# Patient Record
Sex: FEMALE | Race: WHITE | NOT HISPANIC OR LATINO | Employment: FULL TIME | URBAN - NONMETROPOLITAN AREA
[De-identification: names, ages, dates, MRNs, and addresses within clinical notes are randomized per-mention and may not be internally consistent; named-entity substitution may affect disease eponyms.]

---

## 2024-07-22 ENCOUNTER — OFFICE VISIT (OUTPATIENT)
Dept: PSYCHIATRY | Facility: CLINIC | Age: 54
End: 2024-07-22
Payer: COMMERCIAL

## 2024-07-22 VITALS
SYSTOLIC BLOOD PRESSURE: 130 MMHG | BODY MASS INDEX: 34.31 KG/M2 | OXYGEN SATURATION: 97 % | HEIGHT: 68 IN | HEART RATE: 69 BPM | WEIGHT: 226.4 LBS | DIASTOLIC BLOOD PRESSURE: 90 MMHG

## 2024-07-22 DIAGNOSIS — F33.2 SEVERE EPISODE OF RECURRENT MAJOR DEPRESSIVE DISORDER, WITHOUT PSYCHOTIC FEATURES: ICD-10-CM

## 2024-07-22 DIAGNOSIS — F41.1 GAD (GENERALIZED ANXIETY DISORDER): ICD-10-CM

## 2024-07-22 DIAGNOSIS — G47.00 INSOMNIA, UNSPECIFIED TYPE: ICD-10-CM

## 2024-07-22 DIAGNOSIS — Z79.899 MEDICATION MANAGEMENT: Primary | ICD-10-CM

## 2024-07-22 PROBLEM — F32.A DEPRESSION: Status: ACTIVE | Noted: 2024-07-22

## 2024-07-22 PROBLEM — F41.9 ANXIETY: Status: ACTIVE | Noted: 2024-07-22

## 2024-07-22 LAB
EXTERNAL AMPHETAMINE SCREEN URINE: NEGATIVE
EXTERNAL BENZODIAZEPINE SCREEN URINE: NEGATIVE
EXTERNAL BUPRENORPHINE SCREEN URINE: NEGATIVE
EXTERNAL COCAINE SCREEN URINE: NEGATIVE
EXTERNAL MDMA: NEGATIVE
EXTERNAL METHADONE SCREEN URINE: NEGATIVE
EXTERNAL METHAMPHETAMINE SCREEN URINE: NEGATIVE
EXTERNAL OPIATES SCREEN URINE: NEGATIVE
EXTERNAL OXYCODONE SCREEN URINE: NEGATIVE
EXTERNAL THC SCREEN URINE: NEGATIVE

## 2024-07-22 PROCEDURE — 90792 PSYCH DIAG EVAL W/MED SRVCS: CPT

## 2024-07-22 RX ORDER — ESOMEPRAZOLE MAGNESIUM 40 MG/1
CAPSULE, DELAYED RELEASE ORAL
COMMUNITY
Start: 2024-04-29

## 2024-07-22 RX ORDER — ROPINIROLE 2 MG/1
TABLET, FILM COATED ORAL
COMMUNITY

## 2024-07-22 RX ORDER — BUSPIRONE HYDROCHLORIDE 7.5 MG/1
TABLET ORAL EVERY 12 HOURS SCHEDULED
COMMUNITY

## 2024-07-22 RX ORDER — LISINOPRIL 10 MG/1
1 TABLET ORAL 2 TIMES DAILY
COMMUNITY

## 2024-07-22 RX ORDER — DULOXETIN HYDROCHLORIDE 20 MG/1
40 CAPSULE, DELAYED RELEASE ORAL DAILY
Qty: 60 CAPSULE | Refills: 0 | Status: SHIPPED | OUTPATIENT
Start: 2024-07-22 | End: 2024-08-21

## 2024-07-22 RX ORDER — HYDROXYZINE HYDROCHLORIDE 25 MG/1
25 TABLET, FILM COATED ORAL NIGHTLY PRN
Qty: 30 TABLET | Refills: 0 | Status: SHIPPED | OUTPATIENT
Start: 2024-07-22 | End: 2024-08-21

## 2024-07-22 RX ORDER — ROSUVASTATIN CALCIUM 5 MG/1
1 TABLET, COATED ORAL DAILY
COMMUNITY

## 2024-07-22 RX ORDER — CITALOPRAM 20 MG/1
1 TABLET ORAL DAILY
COMMUNITY
Start: 2024-07-18 | End: 2024-07-22

## 2024-07-22 RX ORDER — GABAPENTIN 400 MG/1
CAPSULE ORAL EVERY 8 HOURS SCHEDULED
COMMUNITY
Start: 2024-07-16

## 2024-07-22 NOTE — PROGRESS NOTES
"  Subjective     Anum Brown is a 54 y.o. female who presents today for initial evaluation     Chief Complaint: Patient was referred by [facility] in [place], [place] for worsening depression and anxiety.    History of Present Illness:    This is a new patient, here today for evaluation  Here today with complaints of Depression and Anxiety    She reports having problems with depression in  when COVID first started. Her son was DX with cancer in May 2020. She reports that she had to drop him off at  for treatment and she was not allowed to see him due to Isolation precautions with COVID. She reports she stays upset most of the time, sad. She reports poor motivation and energy.     She reports having problems with anxiety when her life fell apart, 2021. Her son passed away from cancer. He had leukemia and hodgkin's lymphoma. He  at age 26. When her son was dx of cancer (May 2020) her  of 35 years at the time became a drug addict. When her son  her  left her for a woman he was doing drugs with.     Details:  Depression is rated at 6/10, with 10 being the worst. PHQ-9 score: 19  Symptoms include a depressed mood and anhedonia nearly every day. After work she goes home and just lays on the bed and does not do anything. She report no motivation to even clean her house. She reports decreased appetite, sleep disturbance, fatigue, lack of motivation, decreased energy, and decreased concentration. She reports feeling hopeless, helpless, worthless, and powerless. She feels like her life is \"never going to get better\". These symptoms cause impairment in important areas of functioning. She reports having had thoughts of wanting to die to be with her son. She reports thinking that no one would notice. She has three other children and she promised them she would not do anything to harm herself.     Anxiety is rated at 5/10, with 10 being the worst. MUKUL-7 " "score: 9  Patient reports no excessive anxiety, but reports excessive worry, and difficulty controlling worry. She reports feeling overwhelmed, and having \"what if\" thoughts, she reports some restless, feeling on edge, fatigue, sleep disturbance, and decreased concentration. These symptoms cause impairment in important areas of functioning but has improved with medication.     She denies having any anxiety attacks    Sleep is poor, She reports having a hard time going to sleep, waking up once or twice during the night. She is getting about 5 hours of broken sleep per night.     Nightmares: Denies    Appetite is poor. She reports poor appetite, she reports wt loss of 80 pounds in 10 months and then gaining 40 pounds back in one year. Weight recently has been stable. She is eating 2 meals per day,and snacking between meals if hungry. 1 soda each day and 2-3 cups of coffee per day.     She denies Anger- irritability, artie, hypomania, OCD, ADHD or PTSD.     Patient denies SI/HI, A/V hallucinations, or delusions.    Past Psychiatric History:  Symptoms began having problems with depression in  when COVID first started. Her son was DX with cancer in May 2020. She reports that she had to drop him off at  for treatment and she was not allowed to see him due to Isolation precautions with COVID. She reports she stays upset most of the time, sad. She reports poor motivation and energy.   She reports having problems with anxiety when her life fell apart, 2021. Her son passed away from cancer. He had leukemia and hogkins lympoma. He  at age 26. When her son was dx of cancer (May 2020) her  of 35 years at the time became a drug addict. When her son  her  left her for a woman he was doing drugs with.   She states it took her 6 months to finally agree to take medication after the death of her son.   Outpatient: none  Diagnoses:Depression and Anxiety  Admission History:Denies  Medication " Trials: see below  Suicide Attempts:Denies  Self Harm: Denies  Risky behaviors None  Prior abuse: None  Trauma: death of son, having to leave her son at  for treatment for 1 month- and not being able to see him.  cheating on her and leaving her    Previous psychiatric medications include:   Antidepressants: Fluoxetine: quit working, Zoloft: she reports diarrhea, Lexapro: caused rash.  Current: Celexa- did not take.   Antianxiety: Current: Buspirone  Antipsychotics:  Current: haven't started. vraylar  Mood stabilizers: None  ADHD: None    Substance Abuse:  Types:Denies all, including illicit  Alcohol use:  one mixed drink every three months.  Drug use: no  Marijuana use: no  Tobacco: patient started smoking at age 15, she is smoking 1ppd    Social history:   Patient was born [place], [place] and raised there until she was [age]. She moved to [place], [place] at age 11 and has lived there since.   She has been  once but  from her  for 38 years, they were  35 years when he left her. She has three boys and one daughter. One son  of cancer.   Currently living alone, and Milla- her dog.   Patient is    Education: Bachelors of Arts  Employment: employed, at [employer] in [place] for almost 16 years.  : none  Legal: none  Hindu preference: Hinduism            Mormonism attendance: ususally, has not gone in a couple of weeks.   Hobbies/Outside activities: use to like to do crafts and gardening (flowers) but not recently.      Chronic health issues, no acute physical or medical issues today.    The following portions of the patient's history were reviewed and updated as appropriate: allergies, current medications, past family history, past medical history, past social history, past surgical history and problem list.    Past Medical History:  Past Medical History:   Diagnosis Date    Acid reflux     Anxiety     Depression     Hypertension     RLS  (restless legs syndrome)        Social History:  Social History     Socioeconomic History    Marital status: Legally     Number of children: 4    Highest education level: Bachelor's degree (e.g., BA, AB, BS)   Tobacco Use    Smoking status: Every Day     Current packs/day: 1.00     Average packs/day: 1 pack/day for 39.6 years (39.6 ttl pk-yrs)     Types: Cigarettes     Start date: 1985    Smokeless tobacco: Never   Vaping Use    Vaping status: Never Used   Substance and Sexual Activity    Alcohol use: Never    Drug use: Never    Sexual activity: Not Currently       Family History:  Family History   Problem Relation Age of Onset    Hypertension Mother     COPD Father     Heart disease Father     Depression Sister        Past Surgical History:  Past Surgical History:   Procedure Laterality Date    TUMOR REMOVAL Left     behind left ear       Problem List:  Patient Active Problem List   Diagnosis    Depression    Anxiety       Allergy:   Allergies   Allergen Reactions    Aspirin Nausea Only and Other (See Comments)     Causes Chest Pain and Nausea     Codeine Nausea Only and Other (See Comments)     Causes Chest Pain and Nausea         Current Medications:   Current Outpatient Medications   Medication Sig Dispense Refill    busPIRone (BUSPAR) 7.5 MG tablet Every 12 (Twelve) Hours.      esomeprazole (nexIUM) 40 MG capsule       gabapentin (NEURONTIN) 400 MG capsule Every 8 (Eight) Hours.      lisinopril (PRINIVIL,ZESTRIL) 10 MG tablet Take 1 tablet by mouth 2 (Two) Times a Day.      rOPINIRole (REQUIP) 2 MG tablet TAKE ONE TO ONE AND ONE-HALF TABLETS BY MOUTH ONE TO THREE HOURS BEFORE BEDTIME DAILY Orally at bedtime for 90 days      rosuvastatin (CRESTOR) 5 MG tablet Take 1 tablet by mouth Daily.      DULoxetine (CYMBALTA) 20 MG capsule Take 2 capsules by mouth Daily for 30 days. 60 capsule 0    hydrOXYzine (ATARAX) 25 MG tablet Take 1 tablet by mouth At Night As Needed for Anxiety (anxiety and/or sleep) for up  "to 30 days. 30 tablet 0     No current facility-administered medications for this visit.       Review of Symptoms:    Review of Systems   Constitutional:  Positive for appetite change and fatigue.   HENT: Negative.     Eyes: Negative.    Respiratory: Negative.     Cardiovascular: Negative.    Gastrointestinal: Negative.    Endocrine: Negative.    Genitourinary: Negative.    Musculoskeletal: Negative.    Skin: Negative.    Allergic/Immunologic: Negative.    Neurological: Negative.    Hematological: Negative.    Psychiatric/Behavioral:  Positive for decreased concentration, dysphoric mood, sleep disturbance, depressed mood and stress. The patient is nervous/anxious.        Objective     Physical Exam:   Physical Exam  Constitutional:       Appearance: Normal appearance.   Eyes:      Pupils: Pupils are equal, round, and reactive to light.   Cardiovascular:      Rate and Rhythm: Normal rate.   Pulmonary:      Effort: Pulmonary effort is normal.   Musculoskeletal:         General: Normal range of motion.      Cervical back: Normal range of motion.   Skin:     General: Skin is warm and dry.   Neurological:      General: No focal deficit present.      Mental Status: She is alert and oriented to person, place, and time.   Psychiatric:         Attention and Perception: Attention and perception normal.         Mood and Affect: Mood is anxious and depressed. Affect is labile.         Speech: Speech normal.         Behavior: Behavior normal. Behavior is cooperative.         Thought Content: Thought content normal.         Cognition and Memory: Cognition and memory normal.         Judgment: Judgment normal.       Vitals:  Blood pressure 130/90, pulse 69, height 172.7 cm (68\"), weight 103 kg (226 lb 6.4 oz), SpO2 97%.   Body mass index is 34.42 kg/m².    Last 3 Blood Pressure and Pulse Readings:  BP Readings from Last 3 Encounters:   07/22/24 130/90     Pulse Readings from Last 3 Encounters:   07/22/24 69       PHQ-9 Score: " 7/22/24  PHQ-9 Depression Screening  Little interest or pleasure in doing things? 3-->nearly every day   Feeling down, depressed, or hopeless? 3-->nearly every day   Trouble falling or staying asleep, or sleeping too much? 3-->nearly every day   Feeling tired or having little energy? 3-->nearly every day   Poor appetite or overeating? 3-->nearly every day   Feeling bad about yourself - or that you are a failure or have let yourself or your family down? 3-->nearly every day   Trouble concentrating on things, such as reading the newspaper or watching television? 1-->several days   Moving or speaking so slowly that other people could have noticed? Or the opposite - being so fidgety or restless that you have been moving around a lot more than usual? 0-->not at all   Thoughts that you would be better off dead, or of hurting yourself in some way? 0-->not at all   PHQ-9 Total Score 19   If you checked off any problems, how difficult have these problems made it for you to do your work, take care of things at home, or get along with other people? somewhat difficult      PHQ-9 Total Score: 19     MUKUL-7 Score: 7/22/24  Feeling nervous, anxious or on edge: Not at all  Not being able to stop or control worrying: Nearly every day  Worrying too much about different things: More than half the days  Trouble Relaxing: More than half the days  Being so restless that it is hard to sit still: Several days  Feeling afraid as if something awful might happen: Not at all  Becoming easily annoyed or irritable: Several days  MUKUL 7 Total Score: 9  If you checked any problems, how difficult have these problems made it for you to do your work, take care of things at home, or get along with other people: Somewhat difficult     Appearance: Patient is a 54-year-old  female.  She is casually dressed in blue jeans, a blue [employer] polo shirt, and tennis shoes.  Her blonde hair is hanging loosely to her shoulders it is clean and neatly styled.   She is wearing appropriate make-up.  She is tearful and emotional during the interview.  Gait, Station, Strength: WNL    AIMS Scale: no abnormal movements noted or reported- she has not started Vraylar.        Mental Status Exam:   Hygiene:   good  Cooperation:  Cooperative  Eye Contact:  Good  Psychomotor Behavior:  Appropriate  Affect:  emotional  , tearful  Mood: sad, depressed, and anxious  Hopelessness: 9  Speech:  Normal  Thought Process:  Goal directed  Thought Content:  Mood congruent  Suicidal:  None  Homicidal:  None  Hallucinations:  None  Delusion:  None  Memory:  Intact  Orientation:  Person, Place, Time, and Situation  Reliability:  good  Insight:  Fair  Judgement:  Fair  Impulse Control:  Good  Physical/Medical Issues:  Yes , see chart        Lab Results:   Office Visit on 07/22/2024   Component Date Value Ref Range Status    External Amphetamine Screen Urine 07/22/2024 Negative   Final    External Benzodiazepine Screen Uri* 07/22/2024 Negative   Final    External Cocaine Screen Urine 07/22/2024 Negative   Final    External THC Screen Urine 07/22/2024 Negative   Final    External Methadone Screen Urine 07/22/2024 Negative   Final    External Methamphetamine Screen Ur* 07/22/2024 Negative   Final    External Oxycodone Screen Urine 07/22/2024 Negative   Final    External Buprenorphine Screen Urine 07/22/2024 Negative   Final    External MDMA 07/22/2024 Negative   Final    External Opiates Screen Urine 07/22/2024 Negative   Final       Assessment & Plan   Diagnoses and all orders for this visit:    1. Medication management (Primary)  -     [facility] Drug Screen    2. Severe episode of recurrent major depressive disorder, without psychotic features  -     DULoxetine (CYMBALTA) 20 MG capsule; Take 2 capsules by mouth Daily for 30 days.  Dispense: 60 capsule; Refill: 0    3. MUKUL (generalized anxiety disorder)  -     DULoxetine (CYMBALTA) 20 MG capsule; Take 2 capsules by mouth Daily for 30 days.  Dispense: 60  capsule; Refill: 0  -     hydrOXYzine (ATARAX) 25 MG tablet; Take 1 tablet by mouth At Night As Needed for Anxiety (anxiety and/or sleep) for up to 30 days.  Dispense: 30 tablet; Refill: 0    4. Insomnia, unspecified type  -     hydrOXYzine (ATARAX) 25 MG tablet; Take 1 tablet by mouth At Night As Needed for Anxiety (anxiety and/or sleep) for up to 30 days.  Dispense: 30 tablet; Refill: 0        Visit Diagnoses:    ICD-10-CM ICD-9-CM   1. Medication management  Z79.899 V58.69   2. Severe episode of recurrent major depressive disorder, without psychotic features  F33.2 296.33   3. MUKUL (generalized anxiety disorder)  F41.1 300.02   4. Insomnia, unspecified type  G47.00 780.52       GOALS:  Short Term Goals: Patient will be compliant with medication, and patient will have no significant medication related side effects.  Patient will be engaged in psychotherapy as indicated.  Patient will report subjective improvement of symptoms.  Long term goals: To stabilize mood and treat/improve subjective symptoms, the patient will stay out of the hospital, the patient will be at an optimal level of functioning, and the patient will take all medications as prescribed.  The patient/guardian verbalized understanding and agreement with goals that were mutually set.      TREATMENT PLAN:   -Continue buspirone (BuSpar) 7.5 mg twice a day for anxiety.  -Discontinue cariprazine HCL (Vraylar) 1.5 mg daily for depression adjunct.  Patient did not start medicine.  -Discontinue citalopram (Celexa) 20 mg p.o. daily for depression and anxiety.  Patient had an allergic reaction to escitalopram causing a rash to her inner forearms.  Patient did not start this medicine.  -Start duloxetine (Cymbalta) 40 mg p.o. daily for depression and anxiety.  -Start hydroxyzine (Atarax) 25 mg p.o. nightly as needed for anxiety and/or insomnia.  Patient reports worried thoughts that keep her awake.  -Discussed supportive psychotherapy efforts to develop coping  skills to manage stress and emotions.  Schedule an appointment with [facility] for therapy.  -Pharmacological and Non-Pharmacological treatment options discussed during today's visit.   -The benefits of a healthy diet and exercise were discussed with patient, especially the positive effects they have on mental health. Patient encouraged to consider lifestyle modification regarding  diet and exercise patterns to maximize results of mental health treatment.   -We discussed sleep hygiene including going to bed at the same time and getting up at the same time every day, decreased caffeine consumption, going to bed early enough to get 7 or 8 hours in bed, reading and relaxing before bedtime, and avoiding stimulating activities close to bedtime.   -Patient acknowledged and verbally consented with current treatment plan and was educated on the importance of compliance with treatment and follow-up appointments.    -Return to clinic in 4 weeks for follow up.  -Reviewed previous available documentation  -Obtain labs from primary care, [facility] in [place]  -UDS: negative    MEDICATION ISSUES:  -Discussed medication options and treatment plan of prescribed medication as well as the risks, benefits, any black box warnings, and side effects.   -I have explained to the patient drugs of the SSRI/SNRI class can have side effects such as weight gain, sexual dysfunction, insomnia, headache, nausea. I advised the patient of the black box warning for all antidepressants is the increased risk of suicidal thoughts. In addition, he should monitor for signs of serotonin syndrome: shivering, vital sign instability, elevated temperature and hyperreflexia.  Will monitor blood pressure  -Spoke to the patient about specific potential risks associated with the use of antipsychotic medications including any black box warning(s), as well as risk for weight gain, metabolic issues, extra-pyramidal symptoms and tardive dyskinesia. AIMS  assessment completed at initial evaluation/prescription of antipsychotic medication(s) and at least once annually.  Not yet started the Vraylar, we will hold off starting this medication.   -Patient is agreeable to call the office with any worsening of symptoms or onset of side effects, or if any concerns or questions arise.    -The contact information for the office is made available to the patient. Patient is agreeable to call 911 or go to the nearest ER should they begin having any SI/HI, or if any urgent concerns arise. No medication side effects or related complaints today.     MEDS ORDERED DURING VISIT:  New Medications Ordered This Visit   Medications    DULoxetine (CYMBALTA) 20 MG capsule     Sig: Take 2 capsules by mouth Daily for 30 days.     Dispense:  60 capsule     Refill:  0    hydrOXYzine (ATARAX) 25 MG tablet     Sig: Take 1 tablet by mouth At Night As Needed for Anxiety (anxiety and/or sleep) for up to 30 days.     Dispense:  30 tablet     Refill:  0       MEDS DISCONTINUED DURING VISIT:   Medications Discontinued During This Encounter   Medication Reason    Cariprazine HCl (Vraylar) 1.5 MG capsule capsule Patient Reported Not Taking    citalopram (CeleXA) 20 MG tablet Patient Reported Not Taking        Follow Up Appointment:   Return in about 4 weeks (around 8/19/2024) for Recheck.           This document has been electronically signed by NAVI Matthews  July 22, 2024 12:47 EDT    Dictated Utilizing Dragon Dictation: Part of this note may be an electronic transcription/translation of spoken language to printed text using the Dragon Dictation System.

## 2024-07-23 ENCOUNTER — PATIENT ROUNDING (BHMG ONLY) (OUTPATIENT)
Dept: PSYCHIATRY | Facility: CLINIC | Age: 54
End: 2024-07-23
Payer: COMMERCIAL

## 2024-07-23 NOTE — PROGRESS NOTES
July 23, 2024    Hello, may I speak with Anum Brown?    My name is Joy      I am  with [facility]  [address] [place] [address]  [place] [address] [phone].    Before we get started may I verify your date of birth? 1970    I am calling to officially welcome you to our practice and ask about your recent visit. Is this a good time to talk? yes    Tell me about your visit with us. What things went well?  everything went well.       We're always looking for ways to make our patients' experiences even better. Do you have recommendations on ways we may improve?  no    Overall were you satisfied with your first visit to our practice? no       I appreciate you taking the time to speak with me today. Is there anything else I can do for you? no      Thank you, and have a great day.

## 2024-08-17 DIAGNOSIS — F41.1 GAD (GENERALIZED ANXIETY DISORDER): ICD-10-CM

## 2024-08-17 DIAGNOSIS — F33.2 SEVERE EPISODE OF RECURRENT MAJOR DEPRESSIVE DISORDER, WITHOUT PSYCHOTIC FEATURES: ICD-10-CM

## 2024-08-19 ENCOUNTER — OFFICE VISIT (OUTPATIENT)
Dept: PSYCHIATRY | Facility: CLINIC | Age: 54
End: 2024-08-19
Payer: COMMERCIAL

## 2024-08-19 VITALS
HEIGHT: 68 IN | HEART RATE: 68 BPM | WEIGHT: 233 LBS | OXYGEN SATURATION: 98 % | DIASTOLIC BLOOD PRESSURE: 84 MMHG | SYSTOLIC BLOOD PRESSURE: 128 MMHG | BODY MASS INDEX: 35.31 KG/M2

## 2024-08-19 DIAGNOSIS — F33.2 SEVERE EPISODE OF RECURRENT MAJOR DEPRESSIVE DISORDER, WITHOUT PSYCHOTIC FEATURES: Primary | ICD-10-CM

## 2024-08-19 DIAGNOSIS — F41.1 GAD (GENERALIZED ANXIETY DISORDER): ICD-10-CM

## 2024-08-19 DIAGNOSIS — G47.00 INSOMNIA, UNSPECIFIED TYPE: ICD-10-CM

## 2024-08-19 PROCEDURE — 99214 OFFICE O/P EST MOD 30 MIN: CPT

## 2024-08-19 RX ORDER — DULOXETIN HYDROCHLORIDE 20 MG/1
40 CAPSULE, DELAYED RELEASE ORAL DAILY
Qty: 60 CAPSULE | Refills: 0 | Status: SHIPPED | OUTPATIENT
Start: 2024-08-19 | End: 2024-09-18

## 2024-08-19 RX ORDER — BUSPIRONE HYDROCHLORIDE 7.5 MG/1
7.5 TABLET ORAL EVERY 12 HOURS SCHEDULED
Qty: 60 TABLET | Refills: 0 | Status: SHIPPED | OUTPATIENT
Start: 2024-08-19 | End: 2024-09-18

## 2024-08-19 RX ORDER — DULOXETIN HYDROCHLORIDE 20 MG/1
40 CAPSULE, DELAYED RELEASE ORAL DAILY
Qty: 60 CAPSULE | Refills: 0 | OUTPATIENT
Start: 2024-08-19

## 2024-08-19 RX ORDER — HYDROXYZINE HYDROCHLORIDE 25 MG/1
25 TABLET, FILM COATED ORAL NIGHTLY PRN
Qty: 30 TABLET | Refills: 0 | Status: SHIPPED | OUTPATIENT
Start: 2024-08-19 | End: 2024-09-18

## 2024-08-19 NOTE — PROGRESS NOTES
Subjective     Anum Brown is a 54 y.o. female who presents today for follow up    Chief Complaint: Management of anxiety and depression    History of Present Illness:    Today is a follow-up visit.    Medication compliance: patient is compliant with medications, denies SE. She reports she was concerned about some itching when she first started the Cymbalta and was not sure if this was from this medication or from before. She reports she has noticed she feels hungry more frequently and has gained 7 pounds.     She reports she is able to get things done in her home and she is not laying around and crying all day. She reports noticing she is snacking more at home in the evening. She reports that she is staying busy but finds little enjoyment in things.     She reports having problems with depression in  when COVID first started. Her son was DX with cancer in May 2020. She reports that she had to drop him off at  for treatment and she was not allowed to see him due to Isolation precautions with COVID. She reports she stays upset most of the time, sad. She reports poor motivation and energy.      She reports having problems with anxiety when her life fell apart, 2021. Her son passed away from cancer. He had leukemia and hodgkin's lymphoma. He  at age 26. When her son was dx of cancer (May 2020) her  of 35 years at the time became a drug addict. When her son  her  left her for a woman he was doing drugs with.     Details:  Depression is rated at 4/10, with 10 being the worst. PHQ-9 score:4 (prior 19)  Symptoms reports improvement in depressed mood and anhedonia. early every day. She reports improvement in appetite and motivation. She reports continued sleep disturbance, fatigue, decreased energy, and decreased concentration. She reports improvement in feeling hopeless, helpless, and  worthless. She does not feel powerless. These symptoms cause impairment in important areas of  "functioning but have improved with medication. She denies thoughts of wanting to die.       Anxiety is rated at 3/10, with 10 being the worst. MUKUL-7 score: 3(prior 9)  Patient reports no excessive anxiety, but reports excessive worry, and difficulty controlling worry. She reports feeling overwhelmed, and having \"what if\" thoughts, she reports some restless, feeling on edge, fatigue, sleep disturbance, and decreased concentration. She reports that her father has been in the hospital for the last week with COVID symptoms. Patient reports some frustration with her children for not checking their grandfather. She reports that he has not been able to have visitors. Patient feels a little stressed in making decisions. These symptoms cause impairment in important areas of functioning but has improved with medication.      She denies having any anxiety attacks     Sleep is poor, She reports having a hard time going to sleep, waking up once or twice during the night. She is getting about 5-6 hours of broken sleep per night.     Nightmares: Denies     Appetite has improved. She is eating breakfast, dinner and snacking between meals frequently. She is concerned about gaining weight. She had lost weight but does not want to gain weight back. 1-2 cans soda each day and 2-3 cups of coffee per day.      Patient denies SI/HI, A/V hallucinations, or delusions.    Alcohol use:  one mixed drink every three months  Drug use: no  Marijuana use: no  Tobacco:  Patient started smoking at age 15, she is smoking 1 pack/day    Chronic health issues, no acute physical or medical issues today.    The following portions of the patient's history were reviewed and updated as appropriate: allergies, current medications, past family history, past medical history, past social history, past surgical history and problem list.    Previous psychiatric medications include:   Antidepressants: Fluoxetine: quit working- only had went to 40 mg by PCP, Zoloft: " she reports diarrhea, Lexapro: caused rash: Celexa- did not take. Current: Cymbalta  Antianxiety: Current: Buspirone, Atarax   Antipsychotics:  Current: haven't started. vraylar  Mood stabilizers: None  ADHD: None    Past Psychiatric History:    Past Medical History:  Past Medical History:   Diagnosis Date    Acid reflux     Anxiety     Depression     Hypertension     RLS (restless legs syndrome)        Social History:  Social History     Socioeconomic History    Marital status: Legally     Number of children: 4    Highest education level: Bachelor's degree (e.g., BA, AB, BS)   Tobacco Use    Smoking status: Every Day     Current packs/day: 1.00     Average packs/day: 1 pack/day for 39.6 years (39.6 ttl pk-yrs)     Types: Cigarettes     Start date: 1985    Smokeless tobacco: Never   Vaping Use    Vaping status: Never Used   Substance and Sexual Activity    Alcohol use: Never    Drug use: Never    Sexual activity: Not Currently       Family History:  Family History   Problem Relation Age of Onset    Hypertension Mother     COPD Father     Heart disease Father     Depression Sister        Past Surgical History:  Past Surgical History:   Procedure Laterality Date    TUMOR REMOVAL Left     behind left ear       Problem List:  Patient Active Problem List   Diagnosis    Depression    Anxiety       Allergy:   Allergies   Allergen Reactions    Aspirin Nausea Only and Other (See Comments)     Causes Chest Pain and Nausea     Codeine Nausea Only and Other (See Comments)     Causes Chest Pain and Nausea         Current Medications:   Current Outpatient Medications   Medication Sig Dispense Refill    busPIRone (BUSPAR) 7.5 MG tablet Take 1 tablet by mouth Every 12 (Twelve) Hours for 30 days. 60 tablet 0    DULoxetine (CYMBALTA) 20 MG capsule Take 2 capsules by mouth Daily for 30 days. 60 capsule 0    esomeprazole (nexIUM) 40 MG capsule       gabapentin (NEURONTIN) 400 MG capsule Every 8 (Eight) Hours.      hydrOXYzine  (ATARAX) 25 MG tablet Take 1 tablet by mouth At Night As Needed for Anxiety (anxiety and/or sleep) for up to 30 days. 30 tablet 0    lisinopril (PRINIVIL,ZESTRIL) 10 MG tablet Take 1 tablet by mouth 2 (Two) Times a Day.      rOPINIRole (REQUIP) 2 MG tablet TAKE ONE TO ONE AND ONE-HALF TABLETS BY MOUTH ONE TO THREE HOURS BEFORE BEDTIME DAILY Orally at bedtime for 90 days      rosuvastatin (CRESTOR) 5 MG tablet Take 1 tablet by mouth Daily.       No current facility-administered medications for this visit.       Review of Symptoms:    Review of Systems   Constitutional:  Positive for fatigue.   HENT: Negative.     Eyes: Negative.    Respiratory: Negative.     Cardiovascular: Negative.    Gastrointestinal: Negative.    Endocrine: Negative.    Genitourinary: Negative.    Musculoskeletal: Negative.    Skin: Negative.    Allergic/Immunologic: Negative.    Neurological: Negative.    Hematological: Negative.    Psychiatric/Behavioral:  Positive for dysphoric mood, sleep disturbance, depressed mood and stress. The patient is nervous/anxious.        Objective     Physical Exam:   Physical Exam  Constitutional:       Appearance: Normal appearance.   Eyes:      Pupils: Pupils are equal, round, and reactive to light.   Cardiovascular:      Rate and Rhythm: Normal rate.   Pulmonary:      Effort: Pulmonary effort is normal.   Musculoskeletal:         General: Normal range of motion.      Cervical back: Normal range of motion.   Skin:     General: Skin is warm and dry.   Neurological:      General: No focal deficit present.      Mental Status: She is alert and oriented to person, place, and time.   Psychiatric:         Attention and Perception: Attention and perception normal.         Mood and Affect: Affect normal. Mood is anxious and depressed.         Speech: Speech normal.         Behavior: Behavior normal. Behavior is cooperative.         Thought Content: Thought content normal.         Cognition and Memory: Cognition and  "memory normal.         Judgment: Judgment normal.         Vitals:  Blood pressure 128/84, pulse 68, height 172.7 cm (67.99\"), weight 106 kg (233 lb), SpO2 98%.   Body mass index is 35.44 kg/m².    Last 3 Blood Pressure and Pulse Readings:  BP Readings from Last 3 Encounters:   08/19/24 128/84   07/22/24 130/90     Pulse Readings from Last 3 Encounters:   08/19/24 68   07/22/24 69       PHQ-9 Score: 8/19/24  PHQ-9 Depression Screening  Little interest or pleasure in doing things? 0-->not at all   Feeling down, depressed, or hopeless? 0-->not at all   Trouble falling or staying asleep, or sleeping too much? 3-->nearly every day   Feeling tired or having little energy? 0-->not at all   Poor appetite or overeating?     Feeling bad about yourself - or that you are a failure or have let yourself or your family down? 1-->several days   Trouble concentrating on things, such as reading the newspaper or watching television? 0-->not at all   Moving or speaking so slowly that other people could have noticed? Or the opposite - being so fidgety or restless that you have been moving around a lot more than usual? 0-->not at all   Thoughts that you would be better off dead, or of hurting yourself in some way? 0-->not at all   PHQ-9 Total Score 4   If you checked off any problems, how difficult have these problems made it for you to do your work, take care of things at home, or get along with other people? not difficult at all      PHQ-9 Total Score: 4     MUKUL-7 Score: 9/18/24  Feeling nervous, anxious or on edge: Several days  Not being able to stop or control worrying: Several days  Worrying too much about different things: Several days  Trouble Relaxing: Not at all  Being so restless that it is hard to sit still: Not at all  Feeling afraid as if something awful might happen: Not at all  Becoming easily annoyed or irritable: Not at all  MUKUL 7 Total Score: 3  If you checked any problems, how difficult have these problems made it for " you to do your work, take care of things at home, or get along with other people: Not difficult at all     Appearance: Patient is a 54-year-old  female. She is casually dressed in blue jeans, a blue [employer] polo shirt, and tennis shoes. Her blonde hair is hanging loosely to her shoulders it is clean and neatly styled. She is wearing appropriate make-up. She is tearful and emotional during the interview.   Gait, Station, Strength: WNL      Mental Status Exam:   Hygiene:   good  Cooperation:  Cooperative  Eye Contact:  Good  Psychomotor Behavior:  Appropriate  Affect: Appropriate   Mood: depressed and anxious  Hopelessness: 5  Speech:  Normal  Thought Process:  Goal directed  Thought Content:  Mood congruent  Suicidal:  None  Homicidal:  None  Hallucinations:  None  Delusion:  None  Memory:  Intact  Orientation:  Person, Place, Time, and Situation  Reliability:  good  Insight:  Fair  Judgement:  Fair  Impulse Control:  Good  Physical/Medical Issues:  Yes , see chart        Lab Results:   Office Visit on 07/22/2024   Component Date Value Ref Range Status    External Amphetamine Screen Urine 07/22/2024 Negative   Final    External Benzodiazepine Screen Uri* 07/22/2024 Negative   Final    External Cocaine Screen Urine 07/22/2024 Negative   Final    External THC Screen Urine 07/22/2024 Negative   Final    External Methadone Screen Urine 07/22/2024 Negative   Final    External Methamphetamine Screen Ur* 07/22/2024 Negative   Final    External Oxycodone Screen Urine 07/22/2024 Negative   Final    External Buprenorphine Screen Urine 07/22/2024 Negative   Final    External MDMA 07/22/2024 Negative   Final    External Opiates Screen Urine 07/22/2024 Negative   Final         Assessment & Plan   Diagnoses and all orders for this visit:    1. Severe episode of recurrent major depressive disorder, without psychotic features (Primary)  -     DULoxetine (CYMBALTA) 20 MG capsule; Take 2 capsules by mouth Daily for 30 days.   Dispense: 60 capsule; Refill: 0    2. MUKUL (generalized anxiety disorder)  -     busPIRone (BUSPAR) 7.5 MG tablet; Take 1 tablet by mouth Every 12 (Twelve) Hours for 30 days.  Dispense: 60 tablet; Refill: 0  -     DULoxetine (CYMBALTA) 20 MG capsule; Take 2 capsules by mouth Daily for 30 days.  Dispense: 60 capsule; Refill: 0  -     hydrOXYzine (ATARAX) 25 MG tablet; Take 1 tablet by mouth At Night As Needed for Anxiety (anxiety and/or sleep) for up to 30 days.  Dispense: 30 tablet; Refill: 0    3. Insomnia, unspecified type  -     hydrOXYzine (ATARAX) 25 MG tablet; Take 1 tablet by mouth At Night As Needed for Anxiety (anxiety and/or sleep) for up to 30 days.  Dispense: 30 tablet; Refill: 0        Visit Diagnoses:    ICD-10-CM ICD-9-CM   1. Severe episode of recurrent major depressive disorder, without psychotic features  F33.2 296.33   2. MUKUL (generalized anxiety disorder)  F41.1 300.02   3. Insomnia, unspecified type  G47.00 780.52       GOALS:  Short Term Goals: Patient will be compliant with medication, and patient will have no significant medication related side effects.  Patient will be engaged in psychotherapy as indicated.  Patient will report subjective improvement of symptoms.  Long term goals: To stabilize mood and treat/improve subjective symptoms, the patient will stay out of the hospital, the patient will be at an optimal level of functioning, and the patient will take all medications as prescribed.  The patient/guardian verbalized understanding and agreement with goals that were mutually set.    TREATMENT PLAN:   -Continue buspirone (BuSpar) 7.5 mg twice a day for anxiety.  -Continue duloxetine (Cymbalta) 40 mg p.o. daily for depression and anxiety.  -Continue hydroxyzine (Atarax) 25 mg p.o. nightly as needed for anxiety and/or insomnia.  Patient reports worried thoughts that keep her awake.  -Discussed supportive psychotherapy efforts to develop coping skills to manage stress and emotions.  Schedule  an appointment with Greta for therapy.  -Pharmacological and Non-Pharmacological treatment options discussed during today's visit.   -The benefits of a healthy diet and exercise were discussed with patient, especially the positive effects they have on mental health. Patient encouraged to consider lifestyle modification regarding  diet and exercise patterns to maximize results of mental health treatment.   -We discussed sleep hygiene including going to bed at the same time and getting up at the same time every day, decreased caffeine consumption, going to bed early enough to get 7 or 8 hours in bed, reading and relaxing before bedtime, and avoiding stimulating activities close to bedtime.   -Patient acknowledged and verbally consented with current treatment plan and was educated on the importance of compliance with treatment and follow-up appointments.    -Return to clinic in 4 weeks for follow up.  -Reviewed previous available documentation  -Reviewed labs from primary care, [facility] in [place]       MEDICATION ISSUES:  -Discussed medication options and treatment plan of prescribed medication as well as the risks, benefits, any black box warnings, and side effects.   -I have explained to the patient drugs of the SSRI/SNRI class can have side effects such as weight gain, sexual dysfunction, insomnia, headache, nausea. I advised the patient of the black box warning for all antidepressants is the increased risk of suicidal thoughts. In addition, he should monitor for signs of serotonin syndrome: shivering, vital sign instability, elevated temperature and hyperreflexia.  Will monitor blood pressure  -Spoke to the patient about specific potential risks associated with the use of antipsychotic medications including any black box warning(s), as well as risk for weight gain, metabolic issues, extra-pyramidal symptoms and tardive dyskinesia. AIMS assessment completed at initial evaluation/prescription of  antipsychotic medication(s) and at least once annually.  Not yet started the Vraylar, we will hold off starting this medication.   -Patient is agreeable to call the office with any worsening of symptoms or onset of side effects, or if any concerns or questions arise.    -The contact information for the office is made available to the patient. Patient is agreeable to call 911 or go to the nearest ER should they begin having any SI/HI, or if any urgent concerns arise. No medication side effects or related complaints today.   MEDS ORDERED DURING VISIT:  New Medications Ordered This Visit   Medications    busPIRone (BUSPAR) 7.5 MG tablet     Sig: Take 1 tablet by mouth Every 12 (Twelve) Hours for 30 days.     Dispense:  60 tablet     Refill:  0    DULoxetine (CYMBALTA) 20 MG capsule     Sig: Take 2 capsules by mouth Daily for 30 days.     Dispense:  60 capsule     Refill:  0    hydrOXYzine (ATARAX) 25 MG tablet     Sig: Take 1 tablet by mouth At Night As Needed for Anxiety (anxiety and/or sleep) for up to 30 days.     Dispense:  30 tablet     Refill:  0       MEDS DISCONTINUED DURING VISIT:   Medications Discontinued During This Encounter   Medication Reason    busPIRone (BUSPAR) 7.5 MG tablet Reorder    DULoxetine (CYMBALTA) 20 MG capsule Reorder    hydrOXYzine (ATARAX) 25 MG tablet Reorder        Follow Up Appointment:   Return in about 4 weeks (around 9/16/2024) for Recheck.           This document has been electronically signed by NAVI Matthews  August 19, 2024 14:53 EDT    Dictated Utilizing Dragon Dictation: Part of this note may be an electronic transcription/translation of spoken language to printed text using the Dragon Dictation System.

## 2024-08-20 ENCOUNTER — OFFICE VISIT (OUTPATIENT)
Dept: PSYCHIATRY | Facility: CLINIC | Age: 54
End: 2024-08-20
Payer: COMMERCIAL

## 2024-08-20 DIAGNOSIS — F41.1 GAD (GENERALIZED ANXIETY DISORDER): ICD-10-CM

## 2024-08-20 DIAGNOSIS — F33.1 MAJOR DEPRESSIVE DISORDER, RECURRENT EPISODE, MODERATE: Primary | ICD-10-CM

## 2024-08-20 PROCEDURE — 90791 PSYCH DIAGNOSTIC EVALUATION: CPT | Performed by: COUNSELOR

## 2024-08-20 NOTE — PROGRESS NOTES
Patient ID: Anum Brown is a 54 y.o. female presenting to [facility] for assessment with CAREN Pathak, NCC.     Time: 1:15pm  Name of PCP: Aakash Jeffery   Referral source: PCP  Description of current emotional/behavioral concerns: Patient presents this date for initial assessment for depression and anxiety.  Patient discusses being  to her spouse for over 30 years and having 4 children.  Her spouse began using illicit substances and having inappropriate behaviors shortly before their adult son was diagnosed with cancer.  Patient describes that her son was diagnosed with leukemia and passed away a few months later.  She states that he was released from the hospital with approximately 2 weeks left to live.  He was still somewhat active in both he and his brother were out running errands together and had stopped by her place of employment where she was able to visit with him for a short while.  He and his brother returned home to which he started having negative symptoms possibly indicated of a stroke.  Patient and her son assisted EMS with removing her son's cancer from the residence.  Patient's son was flown to [place] for a brain bleed and went into cardiac arrest in route.  He was placed on life support and the family had to make the decision to support.      After the loss of her son, her 's substance use significantly increased and he moved out of the residence.  He is now living with a younger woman and assisting with raising her children.  She states that she has had no contact with her estranged  in the last 2 years since her daughter's graduation from college.  Due to the negativity, he is also estranged from her other adult children.    Patient goes further to discuss that her depression has significantly increased to the point that she was suicidal the week of [place] Day.  Her coworkers noticed some of the difficulty that she was having  and encouraged her to establish an appointment with her medical provider who referred patient for mental health support.  Patient adamantly and convincingly denies current suicidal or homicidal ideation or perceptual disturbance.    Significant Life Events  Has patient been through or witnessed a divorce? no  Currently  from spouse    Has patient experienced a death / loss of relationship? yes  Lost son 8/3/21 to cancer     Was talking to a man that abandoned her with no further contact     Has patient experienced a major accident or tragic events? no    Has patient experienced any other significant life events or trauma (such as verbal, physical, sexual abuse)? no    Educational/Work History  Highest level of education obtained: college    Ever been active duty in the ? no    Patient's Occupation: [employer]     Describe patient's current and past work experience: none     Legal History  The patient has no significant history of legal issues.    Interpersonal/Relational  Marital Status:   Patient's current living situation: alone   Support system: two parent,  family and son  Difficulty getting along with peers: no  Difficulty making new friendships: no  Difficulty maintaining friendships: no  Close with family members: no  [employer] preference: [employer]     Mental/Behavioral Health History  History of prior treatment or hospitalization: began medication management 1 month ago     Are there any significant health issues (current or past): none     History of seizures: no    Family History   Problem Relation Age of Onset    No Known Problems Mother     No Known Problems Father     Depression Sister     Depression Son     Bipolar disorder Son          History of Substance Use:   Patient denies any abuse / use of substances.     PHQ-Score Total:  PHQ-9 Total Score: 6 out of 27   MUKUL-7 Score Total: 4 out of 21      (Scales based on 0 - 10 with 10 being the worst)  Depression: 3 Anxiety:  3       SUICIDE RISK ASSESSMENT/CSSRS  1. Does patient have thoughts of suicide? No, not since July 4th   2. Does patient have intent for suicide? no  3. Does patient have a current plan for suicide? no  4. History of suicide attempts: no  5. History of self harm: no  6. Family history of suicide or attempts: yes, son attempted suicide prior to dying of cancer   7. History of violent behaviors towards others or property or thoughts of committing suicide: no  8. History of sexual aggression toward others: no  9. Access to firearms or weapons: no    Mental Status Exam:   MENTAL STATUS EXAM   General Appearance:  Cleanly groomed and dressed  Eye Contact:  Good eye contact  Attitude:  Cooperative  Motor Activity:  Normal gait, posture  Muscle Strength:  Normal  Speech:  Normal rate, tone, volume  Language:  Spontaneous  Mood and affect:  Mood congruent, depressed and normal, pleasant (emotional)  Hopelessness:  8  Loneliness: 8  Thought Process:  Logical, goal-directed and linear  Associations/ Thought Content:  No delusions  Hallucinations:  None  Suicidal Ideations:  Not present  Homicidal Ideation:  Not present  Sensorium:  Alert  Orientation:  Person, place, time and situation  Immediate Recall, Recent, and Remote Memory:  Intact  Attention Span/ Concentration:  Good  Fund of Knowledge:  Appropriate for age and educational level  Intellectual Functioning:  Average range  Insight:  Fair  Judgement:  Fair  Reliability:  Fair  Impulse Control:  Fair     Impression/Formulation:    VISIT DIAGNOSIS:     ICD-10-CM ICD-9-CM   1. Major depressive disorder, recurrent episode, moderate  F33.1 296.32   2. MUKUL (generalized anxiety disorder)  F41.1 300.02        Patient appeared alert and oriented.  Patient is voluntarily requesting to begin outpatient therapy at [facility].  Patient is receptive to assistance with maintaining a stable lifestyle.  Patient presents with history of grief and depression.  Patient  is agreeable to attend routine therapy sessions.  Patient expressed desire to maintain stability and participate in the therapeutic process.        Crisis Plan:  Symptoms and/or behaviors to indicate a crisis: Feeling sad or low    What calming techniques or other strategies will patient use to de-esclate and stay safe: slow down, breathe, visualize calming self, think it though, listen to music, change focus, take a walk    Who is one person patient can contact to assist with de-escalation? Son    If symptoms/behaviors persist, patient will present to the nearest hospital for an assessment. Advised patient of [facility] 24/7 assessment services.       Treatment Plan: Continue supportive psychotherapy efforts and medications as indicated. Obtain release of information for current treatment team for continuity of care as needed. Patient will adhere to medication regimen as prescribed and report any side effects. Patient will contact this office, call 911 or present to the nearest emergency room should suicidal or homicidal ideations occur.    Short Term Goals: Patient will be compliant with medication, and patient will have no significant medication related side effects.  Patient will be engaged in psychotherapy as indicated.  Patient will report subjective improvement of symptoms.    Long Term Goals: To stabilize mood and treat/improve subjective symptoms, the patient will stay out of the hospital, the patient will be at an optimal level of functioning, and the patient will take all medications as prescribed.The patient verbalized understanding and agreement with goals that were mutually set.      Recommended Referrals: none      This document has been electronically signed by Mago Holbrook, LPCC-S, [facility]  August 20, 2024 14:41 EDT      Part of this note may be an electronic transcription/translation of spoken language to printed text using the Dragon Dictation System.

## 2024-09-16 ENCOUNTER — OFFICE VISIT (OUTPATIENT)
Dept: PSYCHIATRY | Facility: CLINIC | Age: 54
End: 2024-09-16
Payer: COMMERCIAL

## 2024-09-16 VITALS
HEART RATE: 86 BPM | HEIGHT: 68 IN | SYSTOLIC BLOOD PRESSURE: 128 MMHG | WEIGHT: 232.4 LBS | OXYGEN SATURATION: 98 % | DIASTOLIC BLOOD PRESSURE: 84 MMHG | BODY MASS INDEX: 35.22 KG/M2

## 2024-09-16 DIAGNOSIS — F41.1 GAD (GENERALIZED ANXIETY DISORDER): Primary | ICD-10-CM

## 2024-09-16 DIAGNOSIS — G47.00 INSOMNIA, UNSPECIFIED TYPE: ICD-10-CM

## 2024-09-16 DIAGNOSIS — F33.1 MAJOR DEPRESSIVE DISORDER, RECURRENT EPISODE, MODERATE: ICD-10-CM

## 2024-09-16 PROCEDURE — 99214 OFFICE O/P EST MOD 30 MIN: CPT

## 2024-09-16 RX ORDER — BUSPIRONE HYDROCHLORIDE 7.5 MG/1
7.5 TABLET ORAL EVERY 12 HOURS SCHEDULED
Qty: 60 TABLET | Refills: 0 | Status: SHIPPED | OUTPATIENT
Start: 2024-09-16 | End: 2024-10-16

## 2024-09-16 RX ORDER — HYDROXYZINE HYDROCHLORIDE 25 MG/1
25 TABLET, FILM COATED ORAL NIGHTLY PRN
Qty: 30 TABLET | Refills: 0 | Status: SHIPPED | OUTPATIENT
Start: 2024-09-16 | End: 2024-10-16

## 2024-09-16 RX ORDER — LAMOTRIGINE 25 MG/1
25 TABLET ORAL DAILY
Qty: 30 TABLET | Refills: 0 | Status: SHIPPED | OUTPATIENT
Start: 2024-09-16 | End: 2024-10-16

## 2024-10-09 ENCOUNTER — TELEPHONE (OUTPATIENT)
Dept: PSYCHIATRY | Facility: CLINIC | Age: 54
End: 2024-10-09

## 2024-10-09 NOTE — TELEPHONE ENCOUNTER
Pt contacted the office at 3:00 asking that I switch her therapy appointment over to a MyChart video visit, I did that and explained to the patient what she has to do to log on. At 3:15 I marked the patient as a no show once I realized she was not logged on and not marked arrived on the schedule. The patient called the office at 3:58 stating she had been waiting in the virtual waiting room since around 3:20. I let the patient know our schedule never indicated she had logged on or completed her E-check in. I offered to reschedule the patient, she became upset and refused to reschedule with the provider, I then offered to schedule with a different provider that offers similar services as Greta Hernandez, the patient refused. This is the third consecutive appointment the patient has no showed or been late too.

## 2024-10-13 DIAGNOSIS — F33.1 MAJOR DEPRESSIVE DISORDER, RECURRENT EPISODE, MODERATE: ICD-10-CM

## 2024-10-14 RX ORDER — LAMOTRIGINE 25 MG/1
25 TABLET ORAL DAILY
Qty: 30 TABLET | Refills: 0 | Status: SHIPPED | OUTPATIENT
Start: 2024-10-14

## 2024-10-14 NOTE — TELEPHONE ENCOUNTER
Patient says had terrible night with the itchiness from the rash to point she cried. She thinks it's the Lisinopril and wants to stop it.    Refill requested